# Patient Record
Sex: FEMALE | Race: OTHER | NOT HISPANIC OR LATINO
[De-identification: names, ages, dates, MRNs, and addresses within clinical notes are randomized per-mention and may not be internally consistent; named-entity substitution may affect disease eponyms.]

---

## 2023-12-29 ENCOUNTER — APPOINTMENT (OUTPATIENT)
Dept: ULTRASOUND IMAGING | Facility: HOSPITAL | Age: 48
End: 2023-12-29

## 2024-02-21 PROBLEM — Z00.00 ENCOUNTER FOR PREVENTIVE HEALTH EXAMINATION: Status: ACTIVE | Noted: 2024-02-21

## 2024-03-01 ENCOUNTER — APPOINTMENT (OUTPATIENT)
Dept: MAMMOGRAPHY | Facility: HOSPITAL | Age: 49
End: 2024-03-01
Payer: COMMERCIAL

## 2024-03-01 ENCOUNTER — APPOINTMENT (OUTPATIENT)
Dept: ULTRASOUND IMAGING | Facility: HOSPITAL | Age: 49
End: 2024-03-01
Payer: COMMERCIAL

## 2024-03-01 ENCOUNTER — OUTPATIENT (OUTPATIENT)
Dept: OUTPATIENT SERVICES | Facility: HOSPITAL | Age: 49
LOS: 1 days | End: 2024-03-01
Payer: COMMERCIAL

## 2024-03-01 PROCEDURE — 76641 ULTRASOUND BREAST COMPLETE: CPT

## 2024-03-01 PROCEDURE — G0279: CPT | Mod: 26

## 2024-03-01 PROCEDURE — 77066 DX MAMMO INCL CAD BI: CPT | Mod: 26

## 2024-03-01 PROCEDURE — G0279: CPT

## 2024-03-01 PROCEDURE — 76641 ULTRASOUND BREAST COMPLETE: CPT | Mod: 26,50

## 2024-03-01 PROCEDURE — 77066 DX MAMMO INCL CAD BI: CPT

## 2024-03-28 ENCOUNTER — NON-APPOINTMENT (OUTPATIENT)
Age: 49
End: 2024-03-28

## 2024-04-11 ENCOUNTER — APPOINTMENT (OUTPATIENT)
Dept: HEART AND VASCULAR | Facility: CLINIC | Age: 49
End: 2024-04-11
Payer: COMMERCIAL

## 2024-04-11 ENCOUNTER — TRANSCRIPTION ENCOUNTER (OUTPATIENT)
Age: 49
End: 2024-04-11

## 2024-04-11 ENCOUNTER — NON-APPOINTMENT (OUTPATIENT)
Age: 49
End: 2024-04-11

## 2024-04-11 VITALS
SYSTOLIC BLOOD PRESSURE: 120 MMHG | BODY MASS INDEX: 20.89 KG/M2 | TEMPERATURE: 97.9 F | HEIGHT: 66 IN | HEART RATE: 63 BPM | WEIGHT: 130 LBS | DIASTOLIC BLOOD PRESSURE: 69 MMHG

## 2024-04-11 DIAGNOSIS — I47.10 SUPRAVENTRICULAR TACHYCARDIA, UNSPECIFIED: ICD-10-CM

## 2024-04-11 PROCEDURE — 99204 OFFICE O/P NEW MOD 45 MIN: CPT | Mod: 25

## 2024-04-11 PROCEDURE — 93000 ELECTROCARDIOGRAM COMPLETE: CPT

## 2024-04-11 RX ORDER — DILTIAZEM HYDROCHLORIDE 180 MG/1
180 CAPSULE, COATED, EXTENDED RELEASE ORAL
Refills: 0 | Status: ACTIVE | COMMUNITY

## 2024-04-11 RX ORDER — METOPROLOL SUCCINATE 50 MG/1
50 TABLET, EXTENDED RELEASE ORAL
Refills: 0 | Status: ACTIVE | COMMUNITY

## 2024-04-16 NOTE — PHYSICAL EXAM
[Well Developed] : well developed [Well Nourished] : well nourished [No Acute Distress] : no acute distress [Normal Conjunctiva] : normal conjunctiva [5th Left ICS - MCL] : palpated at the 5th LICS in the midclavicular line [Normal Rate] : normal [Rhythm Regular] : regular [Normal S1] : normal S1 [Normal S2] : normal S2 [Clear Lung Fields] : clear lung fields [Good Air Entry] : good air entry [No Respiratory Distress] : no respiratory distress  [Soft] : abdomen soft [Normal Gait] : normal gait [No Edema] : no edema [No Rash] : no rash [Moves all extremities] : moves all extremities [Alert and Oriented] : alert and oriented

## 2024-04-24 NOTE — DISCUSSION/SUMMARY
[EKG obtained to assist in diagnosis and management of assessed problem(s)] : EKG obtained to assist in diagnosis and management of assessed problem(s) [FreeTextEntry1] : 49 year old female with history of hyperthyroidism s/p partial thyroidectomy and SVT, who presents for initial evaluation. Event monitor with paroxysmal SVT with rapid rates that are short in duration.  Most of her symptoms though correlated with NSR or APC/PVC.  She has a structurally normal heart.   We discussed the normal conduction system of the heart and the various types of SVT.  Options for management of her arrhythmia, including observation, vagal maneuvers, medical therapy and catheter based ablation. We discussed risks of ablation including, but not limited to, infection, vascular injury, cardiac perforation, injury to intrinsic conduction system necessitating PPM placement or need for emergent surgery.  We discussed it is unclear if getting rid of SVT will help with her symptoms given event monitor.  SHe would like to think about this and will call us if/when she wants to proceed.  She knows to call [facility] any questions or concerns.    After consideration of this information, the decision was made to proceed with EP study +/- ablation. She.  appeared to understand the whole discussion and verbalized that all her questions were answered to her satisfaction.  Katianae was given pre procedure instructions and knows to call with any questions or concerns.

## 2024-04-24 NOTE — HISTORY OF PRESENT ILLNESS
[FreeTextEntry1] : 49 year old female with history of hyperthyroidism s/p partial thyroidectomy and SVT, who presents for initial evaluation.  She states 3/2023 she went to the ER after standing up and becoming lightheaded.  She was told everything was ok.  She states these episodes were very sporadic until this past fall when she has episodes like this that occur several times a day.  It is brought on by exertion.  Longest episode lasted a minute.  After these events she "feels terrible".  SHe notes new HERNANDEZ when walking up to her third floor apartment.  Episodes do not correlate with change in medications. No chest pain, syncope, orthopnea, PND or edema.  Mild palpitations.      Stress test symptoms 4 mins no SVT some PVCs   Event monitor 10/2023 - [id number]  SVT up to 220 longest 33 seconds.  symptoms did not correlate with SVT- all NSR or ectopy  Echo 11/2023 - Ef 63%  CT angio 1/2024- normal coronaries

## 2024-04-24 NOTE — REVIEW OF SYSTEMS
[Feeling Fatigued] : feeling fatigued [Dyspnea on exertion] : dyspnea during exertion [Palpitations] : palpitations [Negative] : Heme/Lymph [Fever] : no fever [Chills] : no chills [SOB] : no shortness of breath [Chest Discomfort] : no chest discomfort [Syncope] : no syncope

## 2024-04-24 NOTE — ADDENDUM
[FreeTextEntry1] : I, Christos Sargent, hereby attest that the medical record entry for this patient accurately reflects signatures/notations that I made on the Date of Service in my capacity as an Attending Physician when I treated/diagnosed the above patient. I do hereby attest that this information is true, accurate and complete to the best of my knowledge and I understand that any falsification, omission, or concealment of material fact may subject me to administrative, civil, or, criminal liability. I agree with the note as written by my PA in its entirety. I was present for the entire visit and supervised the entire visit and agree with the plan as outlined.  I, Yassine Ellison, am scribing for and the presence of Dr. Sargent the following sections: HPI, PMH,Family/social history, ROS, Physical Exam, Assessment / Plan.

## 2024-05-03 ENCOUNTER — OUTPATIENT (OUTPATIENT)
Dept: OUTPATIENT SERVICES | Facility: HOSPITAL | Age: 49
LOS: 1 days | Discharge: HOME | End: 2024-05-03
Payer: COMMERCIAL

## 2024-05-03 VITALS — HEIGHT: 66 IN | WEIGHT: 130.07 LBS

## 2024-05-03 DIAGNOSIS — Z98.890 OTHER SPECIFIED POSTPROCEDURAL STATES: Chronic | ICD-10-CM

## 2024-05-03 DIAGNOSIS — Z90.710 ACQUIRED ABSENCE OF BOTH CERVIX AND UTERUS: Chronic | ICD-10-CM

## 2024-05-03 LAB
ISTAT INR: 1.2 — HIGH (ref 0.88–1.16)
ISTAT PT: 13.9 SEC — HIGH (ref 10–12.9)
ISTAT VENOUS BE: 0 MMOL/L — SIGNIFICANT CHANGE UP (ref -2–3)
ISTAT VENOUS GLUCOSE: 95 MG/DL — SIGNIFICANT CHANGE UP (ref 70–99)
ISTAT VENOUS HCO3: 25 MMOL/L — SIGNIFICANT CHANGE UP (ref 23–28)
ISTAT VENOUS HEMATOCRIT: 37 % — SIGNIFICANT CHANGE UP (ref 34.5–45)
ISTAT VENOUS HEMOGLOBIN: 12.6 GM/DL — SIGNIFICANT CHANGE UP (ref 11.5–15.5)
ISTAT VENOUS IONIZED CALCIUM: 1.17 MMOL/L — SIGNIFICANT CHANGE UP (ref 1.12–1.3)
ISTAT VENOUS PCO2: 43 MMHG — SIGNIFICANT CHANGE UP (ref 41–51)
ISTAT VENOUS PH: 7.37 — SIGNIFICANT CHANGE UP (ref 7.31–7.41)
ISTAT VENOUS PO2: <66 MMHG — LOW (ref 35–40)
ISTAT VENOUS POTASSIUM: 3.8 MMOL/L — SIGNIFICANT CHANGE UP (ref 3.5–5.3)
ISTAT VENOUS SO2: 39 % — SIGNIFICANT CHANGE UP
ISTAT VENOUS SODIUM: 140 MMOL/L — SIGNIFICANT CHANGE UP (ref 135–145)
ISTAT VENOUS TCO2: 26 MMOL/L — SIGNIFICANT CHANGE UP (ref 22–31)
POCT ISTAT CREATININE: 0.8 MG/DL — SIGNIFICANT CHANGE UP (ref 0.5–1.3)

## 2024-05-03 PROCEDURE — 93623 PRGRMD STIMJ&PACG IV RX NFS: CPT

## 2024-05-03 PROCEDURE — C1733: CPT

## 2024-05-03 PROCEDURE — 93653 COMPRE EP EVAL TX SVT: CPT

## 2024-05-03 PROCEDURE — 84132 ASSAY OF SERUM POTASSIUM: CPT

## 2024-05-03 PROCEDURE — 82565 ASSAY OF CREATININE: CPT

## 2024-05-03 PROCEDURE — C1893: CPT

## 2024-05-03 PROCEDURE — C1894: CPT

## 2024-05-03 PROCEDURE — 84295 ASSAY OF SERUM SODIUM: CPT

## 2024-05-03 PROCEDURE — C1731: CPT

## 2024-05-03 PROCEDURE — 93623 PRGRMD STIMJ&PACG IV RX NFS: CPT | Mod: 26

## 2024-05-03 PROCEDURE — C1730: CPT

## 2024-05-03 PROCEDURE — 82803 BLOOD GASES ANY COMBINATION: CPT

## 2024-05-03 PROCEDURE — 82330 ASSAY OF CALCIUM: CPT

## 2024-05-03 PROCEDURE — 99221 1ST HOSP IP/OBS SF/LOW 40: CPT | Mod: 25

## 2024-05-03 PROCEDURE — 85610 PROTHROMBIN TIME: CPT

## 2024-05-03 PROCEDURE — 82947 ASSAY GLUCOSE BLOOD QUANT: CPT

## 2024-05-03 PROCEDURE — C1760: CPT

## 2024-05-03 PROCEDURE — 85014 HEMATOCRIT: CPT

## 2024-05-03 RX ORDER — METOPROLOL TARTRATE 50 MG
1 TABLET ORAL
Refills: 0 | DISCHARGE

## 2024-05-03 RX ORDER — LANOLIN ALCOHOL/MO/W.PET/CERES
1 CREAM (GRAM) TOPICAL
Refills: 0 | DISCHARGE

## 2024-05-03 RX ORDER — DILTIAZEM HCL 120 MG
1 CAPSULE, EXT RELEASE 24 HR ORAL
Refills: 0 | DISCHARGE

## 2024-05-03 RX ORDER — ACETAMINOPHEN 500 MG
650 TABLET ORAL ONCE
Refills: 0 | Status: COMPLETED | OUTPATIENT
Start: 2024-05-03 | End: 2024-05-03

## 2024-05-03 RX ORDER — LORATADINE 10 MG/1
1 TABLET ORAL
Refills: 0 | DISCHARGE

## 2024-05-03 RX ADMIN — Medication 650 MILLIGRAM(S): at 17:09

## 2024-05-03 NOTE — H&P ADULT - NSHPPOADEEPVENOUSTHROMB_GEN_A_CORE
LM for daughter to return call.    
Notified patient's daughter of results and recommendations    Macrobid bid x5 days.  Rx sent to [facility].    
Patient has UTI,please treat with Bactrim DS BID x 3 days or Macrobid BID x 5 days,if there are no contraindications    
no

## 2024-05-03 NOTE — H&P ADULT - ASSESSMENT
49 year old female with history of hyperthyroidism s/p partial thyroidectomy and SVT, who presents for  scheduled ablation.

## 2024-05-03 NOTE — CHART NOTE - NSCHARTNOTEFT_GEN_A_CORE
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   There were no complications. No effusion noted at the completion of the case on TTE.   Access via right (and left) femoral vein. 8Fr 6Fr 6Fr. Hemostasis obtained via vascade device x 3.   Bedrest x 3 hours.   Continue metoprolol, discontinue diltiazem.   Can be discharged home this evening with follow up with Dr Sargent.

## 2024-05-03 NOTE — H&P ADULT - HISTORY OF PRESENT ILLNESS
49 year old female with history of hyperthyroidism s/p partial thyroidectomy and SVT, who presents for  scheduled ablation.   ?  She states 3/2023 she went to the ER after standing up and becoming lightheaded. She was told everything was ok. She states these episodes were very sporadic until this past fall when she has episodes like this that occur several times a day. It is brought on by exertion. Longest episode lasted a minute. After these events she "feels terrible". SHe notes new HERNANDEZ when walking up to her third floor apartment. Episodes do not correlate with change in medications. No chest pain, syncope, orthopnea, PND or edema. Mild palpitations.